# Patient Record
Sex: FEMALE | Race: WHITE | Employment: OTHER | URBAN - METROPOLITAN AREA
[De-identification: names, ages, dates, MRNs, and addresses within clinical notes are randomized per-mention and may not be internally consistent; named-entity substitution may affect disease eponyms.]

---

## 2017-05-12 PROBLEM — I10 HTN (HYPERTENSION), BENIGN: Status: ACTIVE | Noted: 2017-05-12

## 2017-05-12 PROBLEM — I70.0 AORTIC ATHEROSCLEROSIS (HCC): Status: ACTIVE | Noted: 2017-05-12

## 2017-05-12 PROBLEM — I70.219 ATHEROSCLEROTIC PVD WITH INTERMITTENT CLAUDICATION (HCC): Status: ACTIVE | Noted: 2017-05-12

## 2018-06-12 PROBLEM — Z01.810 PREOP CARDIOVASCULAR EXAM: Status: ACTIVE | Noted: 2018-06-12

## 2018-07-02 PROBLEM — I73.9 PVD (PERIPHERAL VASCULAR DISEASE) (HCC): Status: ACTIVE | Noted: 2018-07-02

## 2021-07-30 PROBLEM — E03.9 ACQUIRED HYPOTHYROIDISM: Status: ACTIVE | Noted: 2021-07-30

## 2022-05-03 ENCOUNTER — PATIENT OUTREACH (OUTPATIENT)
Dept: FAMILY MEDICINE CLINIC | Facility: CLINIC | Age: 82
End: 2022-05-03

## 2022-05-03 NOTE — PROGRESS NOTES
Patient returning phone call states she see's Dr Zehra Farah as her primary     Told patient to call her insurance to verify her primary

## 2022-05-03 NOTE — PROGRESS NOTES
New patient- Due for Supervisit. Left message for patient to call office.   Patient needs appointment for supervisit

## 2023-03-02 ENCOUNTER — PATIENT OUTREACH (OUTPATIENT)
Dept: CASE MANAGEMENT | Age: 83
End: 2023-03-02

## 2023-03-02 NOTE — PROCEDURES
The office order for PCP removal request is Approved and finalized on March 2, 2023.     Thanks,  [facility] [employer]